# Patient Record
Sex: MALE | Race: BLACK OR AFRICAN AMERICAN | Employment: FULL TIME | URBAN - METROPOLITAN AREA
[De-identification: names, ages, dates, MRNs, and addresses within clinical notes are randomized per-mention and may not be internally consistent; named-entity substitution may affect disease eponyms.]

---

## 2022-01-06 ENCOUNTER — HOSPITAL ENCOUNTER (EMERGENCY)
Age: 27
Discharge: LEFT BEFORE DISCHARGE | End: 2022-01-06
Payer: OTHER GOVERNMENT

## 2022-01-06 VITALS
TEMPERATURE: 99 F | RESPIRATION RATE: 16 BRPM | HEIGHT: 67 IN | SYSTOLIC BLOOD PRESSURE: 116 MMHG | BODY MASS INDEX: 25.11 KG/M2 | DIASTOLIC BLOOD PRESSURE: 84 MMHG | WEIGHT: 160 LBS | OXYGEN SATURATION: 100 % | HEART RATE: 99 BPM

## 2022-01-06 LAB — SARS-COV-2, COV2: NORMAL

## 2022-01-06 PROCEDURE — 75810000275 HC EMERGENCY DEPT VISIT NO LEVEL OF CARE

## 2022-01-06 PROCEDURE — U0003 INFECTIOUS AGENT DETECTION BY NUCLEIC ACID (DNA OR RNA); SEVERE ACUTE RESPIRATORY SYNDROME CORONAVIRUS 2 (SARS-COV-2) (CORONAVIRUS DISEASE [COVID-19]), AMPLIFIED PROBE TECHNIQUE, MAKING USE OF HIGH THROUGHPUT TECHNOLOGIES AS DESCRIBED BY CMS-2020-01-R: HCPCS

## 2022-01-06 NOTE — ED TRIAGE NOTES
Pt.AOx4, steady gait, NAD, c/o fever/vomitting/nasal congestion x 3 days, pt.  Reports wife had covid recently

## 2022-01-07 LAB — SARS-COV-2, NAA: DETECTED

## 2022-05-14 ENCOUNTER — APPOINTMENT (OUTPATIENT)
Dept: GENERAL RADIOLOGY | Age: 27
End: 2022-05-14
Attending: EMERGENCY MEDICINE
Payer: OTHER GOVERNMENT

## 2022-05-14 ENCOUNTER — HOSPITAL ENCOUNTER (EMERGENCY)
Age: 27
Discharge: HOME | End: 2022-05-14
Attending: EMERGENCY MEDICINE
Payer: OTHER GOVERNMENT

## 2022-05-14 VITALS
HEART RATE: 101 BPM | RESPIRATION RATE: 16 BRPM | SYSTOLIC BLOOD PRESSURE: 133 MMHG | OXYGEN SATURATION: 99 % | WEIGHT: 160 LBS | DIASTOLIC BLOOD PRESSURE: 74 MMHG | HEIGHT: 67 IN | BODY MASS INDEX: 25.11 KG/M2 | TEMPERATURE: 98.2 F

## 2022-05-14 DIAGNOSIS — R07.81 RIB PAIN: Primary | ICD-10-CM

## 2022-05-14 PROCEDURE — 99283 EMERGENCY DEPT VISIT LOW MDM: CPT

## 2022-05-14 PROCEDURE — 74011000250 HC RX REV CODE- 250: Performed by: PHYSICIAN ASSISTANT

## 2022-05-14 PROCEDURE — 71100 X-RAY EXAM RIBS UNI 2 VIEWS: CPT

## 2022-05-14 RX ORDER — LIDOCAINE 4 G/100G
1 PATCH TOPICAL
Status: DISCONTINUED | OUTPATIENT
Start: 2022-05-14 | End: 2022-05-14 | Stop reason: HOSPADM

## 2022-05-14 RX ORDER — LIDOCAINE 50 MG/G
PATCH TOPICAL
Qty: 30 EACH | Refills: 0 | Status: SHIPPED | OUTPATIENT
Start: 2022-05-14

## 2022-05-14 RX ORDER — IBUPROFEN 600 MG/1
600 TABLET ORAL
Qty: 20 TABLET | Refills: 0 | Status: SHIPPED | OUTPATIENT
Start: 2022-05-14

## 2022-05-14 NOTE — DISCHARGE INSTRUCTIONS
Take medication as prescribed. Follow-up with your primary care physician within 2 days for reassessment. Bring the results from this visit with you for their review. Return to the ED immediately for any new, worsening, or persistent symptoms, including chest pain, shortness of breath, or any other medical concerns.

## 2022-05-14 NOTE — ED PROVIDER NOTES
EMERGENCY DEPARTMENT HISTORY AND PHYSICAL EXAM    4:26 PM      Date: 5/14/2022  Patient Name: Issa Damon    History of Presenting Illness     Chief Complaint   Patient presents with    Rib Injury    Rib Pain         History Provided By: Patient    Additional History (Context): Issa Damon is a 27 y.o. male with No significant past medical history who presents with complaint of left posterior rib pain x 2 days. Patient notes he was attempting to crack his wife's back last night when he noticed sudden pain. Patient is he presented to the emergency department for an x-ray to make sure that there was no evidence of fracture. Patient denies fever or chills, diaphoresis, chest pain, dyspnea, nausea or vomiting. Denies history of cardiac disease, DVT or PE, hemoptysis, recent surgery or travel. Patient notes he does smoke. Notes he took Motrin for symptoms prior to arrival with mild relief. Notes pain is worse with movement. PCP: None      Past History     Past Medical History:  No past medical history on file. Past Surgical History:  No past surgical history on file. Family History:  No family history on file. Social History:  Social History     Tobacco Use    Smoking status: Not on file    Smokeless tobacco: Not on file   Substance Use Topics    Alcohol use: Not on file    Drug use: Not on file       Allergies:  No Known Allergies      Review of Systems       Review of Systems   Constitutional: Negative for chills and fever. Respiratory: Negative for shortness of breath. Cardiovascular: Negative for chest pain. Gastrointestinal: Negative for abdominal pain, nausea and vomiting. Musculoskeletal: Positive for arthralgias and myalgias. Skin: Negative for rash. Neurological: Negative for weakness. All other systems reviewed and are negative.         Physical Exam     Visit Vitals  /74   Pulse (!) 101   Temp 98.2 °F (36.8 °C)   Resp 16   Ht 5' 7\" (1.702 m)   Wt 72.6 kg (160 lb)   SpO2 99%   BMI 25.06 kg/m²         Physical Exam  Vitals and nursing note reviewed. Constitutional:       General: He is not in acute distress. Appearance: Normal appearance. He is well-developed. He is not ill-appearing, toxic-appearing or diaphoretic. HENT:      Head: Normocephalic and atraumatic. Cardiovascular:      Rate and Rhythm: Normal rate and regular rhythm. Heart sounds: Normal heart sounds. No murmur heard. No friction rub. No gallop. Pulmonary:      Effort: Pulmonary effort is normal. No respiratory distress. Breath sounds: Normal breath sounds. No wheezing or rales. Chest:      Chest wall: Tenderness (left lower posterior ribs TTP without erythema/edema/ecchymosis ) present. No deformity, swelling, crepitus or edema. Musculoskeletal:         General: Normal range of motion. Cervical back: Normal range of motion and neck supple. Skin:     General: Skin is warm. Findings: No rash. Neurological:      Mental Status: He is alert. Diagnostic Study Results     Labs -  No results found for this or any previous visit (from the past 12 hour(s)). Radiologic Studies -   XR RIBS LT UNI 2 V   Final Result   1. No left acute cardiopulmonary process. 2.  No acute rib fracture appreciated. Medical Decision Making   I am the first provider for this patient. I reviewed the vital signs, available nursing notes, past medical history, past surgical history, family history and social history. Vital Signs-Reviewed the patient's vital signs. Records Reviewed: Nursing Notes and Old Medical Records (Time of Review: 4:26 PM)    ED Course: Progress Notes, Reevaluation, and Consults:  5:15 PM  Reviewed results and plan with patient. Discussed need for close outpatient follow-up this week for reassessment. Printed x-ray report for his records.  Discussed strict return precautions, including chest pain, shortness of breath, or any other medical concerns. Pt in agreement with plan, ready to go home. Provider Notes (Medical Decision Making): 69-year-old male who presents the ED due to left posterior rib pain x2 days after injury. 99% on room air. No ecchymosis, edema, deformity. Lungs clear to auscultation bilaterally. X-ray without evidence of acute process. Patient is stable for discharge with symptomatic management, close outpatient follow-up for further assessment. Strict return precautions provided    Diagnosis     Clinical Impression:   1. Rib pain        Disposition: home     Follow-up Information     Follow up With Specialties Details Why [facility]    [address] [place] [address] EMERGENCY DEPT Emergency Medicine  If symptoms worsen [address] [address]  [address]  Schedule an appointment as soon as possible for a visit   Ct[facility] Higginbotham [address] [place] [address] [address]           Patient's Medications   Start Taking    IBUPROFEN (MOTRIN) 600 MG TABLET    Take 1 Tablet by mouth every six (6) hours as needed for Pain. LIDOCAINE (LIDODERM) 5 %    Apply patch to the affected area for 12 hours a day and remove for 12 hours a day. Continue Taking    No medications on file   These Medications have changed    No medications on file   Stop Taking    No medications on file       Dictation disclaimer:  Please note that this dictation was completed with [employer], the computer voice recognition software. Quite often unanticipated grammatical, syntax, homophones, and other interpretive errors are inadvertently transcribed by the computer software. Please disregard these errors. Please excuse any errors that have escaped final proofreading.

## 2022-05-14 NOTE — ED TRIAGE NOTES
Pt states he was attempting to crack his wifes back and felt his left ribs shift. States pain is very bad and he wants to make sure a rib isn't broke.